# Patient Record
Sex: FEMALE | Race: OTHER
[De-identification: names, ages, dates, MRNs, and addresses within clinical notes are randomized per-mention and may not be internally consistent; named-entity substitution may affect disease eponyms.]

---

## 2018-05-20 ENCOUNTER — HOSPITAL ENCOUNTER (EMERGENCY)
Dept: HOSPITAL 62 - ER | Age: 33
Discharge: HOME | End: 2018-05-20
Payer: MEDICAID

## 2018-05-20 VITALS — DIASTOLIC BLOOD PRESSURE: 87 MMHG | SYSTOLIC BLOOD PRESSURE: 139 MMHG

## 2018-05-20 DIAGNOSIS — M79.605: ICD-10-CM

## 2018-05-20 DIAGNOSIS — M54.9: ICD-10-CM

## 2018-05-20 DIAGNOSIS — M79.604: ICD-10-CM

## 2018-05-20 DIAGNOSIS — M54.42: Primary | ICD-10-CM

## 2018-05-20 PROCEDURE — 96372 THER/PROPH/DIAG INJ SC/IM: CPT

## 2018-05-20 PROCEDURE — 72110 X-RAY EXAM L-2 SPINE 4/>VWS: CPT

## 2018-05-20 PROCEDURE — 99283 EMERGENCY DEPT VISIT LOW MDM: CPT

## 2018-05-20 NOTE — RADIOLOGY REPORT (SQ)
EXAM DESCRIPTION:  L SPINE WHOLE



COMPLETED DATE/TIME:  5/20/2018 8:24 pm



REASON FOR STUDY:  low back pain



COMPARISON:  10/16/2015



NUMBER OF VIEWS:  Five views including obliques.



TECHNIQUE:  AP, lateral, oblique, and sacral radiographic images acquired of the lumbar spine.



LIMITATIONS:  None.



FINDINGS:  MINERALIZATION: Normal.

SEGMENTATION: Normal.  No transitional anatomy.

ALIGNMENT: Normal.

VERTEBRAE: Maintained height.  No fracture or worrisome bone lesion.

DISCS: Preserved height.  No significant osteophytes or end plate irregularity.

POSTERIOR ELEMENTS: Pedicles and facets are intact.  No pars defect or posterior arch defects.

HARDWARE: None in the spine.

PARASPINAL SOFT TISSUES: Normal.

PELVIS: Intact as visualized. No fractures or worrisome bone lesions. SI joints intact.

OTHER: No other significant finding.



IMPRESSION:  No acute findings.



TECHNICAL DOCUMENTATION:  JOB ID:  [id number]

[id number]

 2011 [employer]- All Rights Reserved



Reading location - IP/workstation name: [email or web address]

## 2018-05-20 NOTE — ER DOCUMENT REPORT
ED Neck/Back Problem





- General


Chief Complaint: Back Pain


Stated Complaint: BACK PAIN


Time Seen by Provider: 05/20/18 19:34


Mode of Arrival: Ambulatory


Information source: Patient


Notes: 





33-year-old female presents to ED for bilateral low back pain radiates down 

both legs.  She states she has had low back pain since she was a teenager.  She 

states she has been seen for this about a year ago and given tramadol that was 

of no help.  She states she has never had an x-ray of her back even though she 

has been hurting since she was a teenager.


TRAVEL OUTSIDE OF THE [place] IN LAST 30 DAYS: No





- HPI


Patient complains to provider of: Lower back


Onset: Other - Patient states she has had the low back pain off and on since 

she was a teenager this time and started yesterday and is gotten progressively 

worse all day.


Onset: Chronic


Timing: Still present


Quality of pain: Achy, Sharp


Severity: Severe


Pain Level: 5


Recent injury: No


Associated symptoms: Like prior neck/back pain, Radiation to leg, Lower back 

pain.  denies: Incontinence, Motor loss, Numbness/tingling, Radiation to arm, 

Radiation to chest, Sensory loss, Sweaty, Unable to urinate, Upper back pain


Exacerbated by: Movement of trunk, Sitting position


Relieved by: Nothing


Similar symptoms previously: Yes


Recently seen / treated by doctor: No





- Related Data


Allergies/Adverse Reactions: 


 





No Known Allergies Allergy (Verified 05/20/18 18:37)


 











Past Medical History





- General


Information source: Patient





- Social History


Smoking Status: Never Smoker


Cigarette use (# per day): No


Smoking Education Provided: No


Frequency of alcohol use: None


Drug Abuse: None


Lives with: Family


Family History: Reviewed & Not Pertinent


Patient has suicidal ideation: No


Patient has homicidal ideation: No





- Past Medical History


Cardiac Medical History: Reports: None


Pulmonary Medical History: Reports: None


EENT Medical History: Reports: None


Neurological Medical History: Reports: None


Endocrine Medical History: Reports: None


Renal/ Medical History: Reports: None


Malignancy Medical History: Reports: None


GI Medical History: Reports: None


Musculoskeltal Medical History: Reports Other - Chronic back pain with sciatica


Skin Medical History: Reports None


Psychiatric Medical History: Reports: None


Traumatic Medical History: Reports: None


Infectious Medical History: Reports: None


Past Surgical History: Reports: Hx Appendectomy, Hx Tonsillectomy





- Immunizations


Hx Diphtheria, Pertussis, Tetanus Vaccination: No





Review of Systems





- Review of Systems


Constitutional: No symptoms reported


EENT: No symptoms reported


Cardiovascular: No symptoms reported


Respiratory: No symptoms reported


Gastrointestinal: No symptoms reported


Genitourinary: No symptoms reported


Female Genitourinary: No symptoms reported


Musculoskeletal: Back pain, Muscle pain, Muscle stiffness


Skin: No symptoms reported


Hematologic/Lymphatic: No symptoms reported


Neurological/Psychological: No symptoms reported





Physical Exam





- Vital signs


Vitals: 


 











Temp Pulse Resp BP Pulse Ox


 


 98.1 F   95   18   139/87 H  99 


 


 05/20/18 18:40  05/20/18 18:40  05/20/18 18:40  05/20/18 18:40  05/20/18 18:40











Interpretation: Normal





- General


General appearance: Appears well, Alert





- HEENT


Head: Normocephalic, Atraumatic


Eyes: Normal


Pupils: PERRL





- Respiratory


Respiratory status: No respiratory distress


Chest status: Nontender


Breath sounds: Normal


Chest palpation: Normal





- Cardiovascular


Rhythm: Regular


Heart sounds: Normal auscultation


Murmur: No





- Abdominal


Inspection: Normal


Distension: No distension


Bowel sounds: Normal


Tenderness: Nontender


Organomegaly: No organomegaly





- Back


Back: Normal, Tender, Vertebra tenderness, Other - Tenderness to the sacroiliac 

joint.  No: Deformity/step-off, CVA tenderness, Scars, Scoliosis, Wounds





- Extremities


General upper extremity: Normal inspection, Nontender, Normal color, Normal ROM

, Normal temperature


General lower extremity: Normal inspection, Nontender, Normal color, Normal ROM

, Normal temperature, Normal weight bearing.  No: Danielito's sign





- Neurological


Neuro grossly intact: Yes


Cognition: Normal


Orientation: AAOx4


Damion Coma Scale Eye Opening: Spontaneous


Damion Coma Scale Verbal: Oriented


Damion Coma Scale Motor: Obeys Commands


Damion Coma Scale Total: 15


Speech: Normal


Cranial nerves: Normal


Motor strength normal: LUE, RUE, LLE, RLE


Additional motor exam normals: Equal 


Babinski reflex: Normal (flexor plantar)


Sensory: Normal


Knee - Reflex grade: 2 = Normal


Ankle - Reflex grade: 2 = Normal





- Psychological


Associated symptoms: Normal affect, Normal mood





- Skin


Skin Temperature: Warm


Skin Moisture: Dry


Skin Color: Normal





Course





- Re-evaluation


Re-evalutation: 





05/20/18 20:37


After performing a Medical Screening Examination, I estimate there is LOW risk 

for EXPANDING OR RUPTURED ABDOMINAL AORTIC ANEURYSM, CAUDA EQUINA SYNDROME, 

EPIDURAL MASS LESION, or HERNIATED DISK CAUSING SEVERE SPINAL STENOSIS, thus I 

consider the discharge disposition reasonable.  I have reevaluated this patient 

multiple times and no significant life threatening changes are noted. The 

patient  and I have discussed the diagnosis and risks, and we agree with 

discharging home and close follow-up. We also discussed returning to the 

Emergency Department immediately if new or worsening symptoms occur with the 

understanding that symptoms and presentations can change. We have discussed the 

symptoms which are most concerning (e.g., saddle anesthesia, urinary or bowel 

incontinence or retention, changing or worsening pain) that necessitate 

immediate return.





- Vital Signs


Vital signs: 


 











Temp Pulse Resp BP Pulse Ox


 


 98.1 F   95   18   139/87 H  99 


 


 05/20/18 18:40  05/20/18 18:40  05/20/18 18:40  05/20/18 18:40  05/20/18 18:40














- Diagnostic Test


Radiology reviewed: Image reviewed, Reports reviewed





Discharge





- Discharge


Clinical Impression: 


Low back pain


Qualifiers:


 Chronicity: chronic Back pain laterality: bilateral Sciatica presence: with 

sciatica Sciatica laterality: bilateral sciatica Qualified Code(s): M54.42 - 

Lumbago with sciatica, left side





Condition: Stable


Disposition: HOME, SELF-CARE


Additional Instructions: 


LOW BACK PAIN:





     Three out of every four people will have an episode of disabling back pain 

during their lifetime.  Most commonly the pain is due to straining of the 

muscles and ligaments in the low back.


     Usual treatment includes: 


(1) Rest on a firm surface.  Avoid lying on your stomach.  


(2) Ice pack the painful area.  After a few days, gentle heat may be used 

intermittently to relax the area, or ice packs can be continued.  


(3) Medication may be needed -- muscle relaxers and antiinflammatory medicines 

are commonly used.  


(4) As the back improves, exercises are prescribed to strengthen the back and 

abdominal muscles.


     Your doctor will advise you on the proper care for your back at each stage 

in your recovery.  You may be better in a few days -- or healing may take 

several weeks.


     If new symptoms of a "herniated disc" (radiation of pain, numbness, or 

tingling down the back of the leg or weakness in the leg) occur, you should be 

re-examined.  Further testing may be necessary.





STEROID MEDICATION:








     You have been given a medicine of the cortisone/steroid class.  This 

medication is used to control inflammation or allergy.  It is usually only 

given for a short period of time, until the acute process subsides.


     There are usually no side effects from short-term use of cortisone-like 

medications.  Some persons feel an increased sense of well-being and are not 

sleepy at bedtime.  Long-term use of cortisone medications is best avoided, 

unless required for a severe condition.  If your condition does not remit, or 

relapses after the course of corticosteroid medication, you should consult your 

physician.





Stretching Exercises for the Back





     The physician has recommended that you begin stretching exercises for your 

back.  These are often used even while the back is painful. However, you should 

notify the physician if the activities seem to increase your pain.


     PELVIC TILT:  Lie flat on your back with knees bent.  Tighten your stomach 

and buttock muscles so it flattens your lower back against the floor.  Hold 10 

seconds.  Repeat 10 times, twice daily.


     KNEE RAISE:  Lying on the back with knees bent, raise one knee to your 

chest, then the other.  Hold both knees against the chest 10 seconds, then 

lower one knee at a time.  Repeat 10 times, twice daily.


     PARTIAL TRUNK RAISE:  Lie face down, arms at your sides.  Keeping your 

waist on the floor, use your arms raise your chest up.  Support yourself on 

your elbows for 30 seconds.  Repeat twice daily, increasing the time to two 

minutes as you recover.





MUSCLE RELAXERS: 


     Muscle relaxing medications are usually prescribed for acute muscle spasm 

or injury to the neck and back.  They are often combined with antiinflammatory 

pain medication for increased relief.


     You may stop the muscle relaxer when the pain and stiffness have improved.

  Start the medication again if spasms recur.  


     Muscle relaxers may cause drowsiness, especially with the first dose.  Do 

not operate machinery or drive while under the effects of the medication.  Most 

muscle relaxers last up to 24 hours.  Do not combine the medication with 

alcohol.








ICE PACKS:


     Apply ice packs frequently against the painful area.  Many different 

schedules are recommended, such as "20 minutes on, 20 minutes off" or "one hour 

ice, two hours rest."  If you need to work, you may need to go longer between 

ice treatments.  You should plan to have the area ice packed AT LEAST one 

fourth of the time.


     The ice should be applied over the wrap, tape, or splint, or over a layer 

of cloth -- not directly against the skin.  Some ice bags have a built-in cloth 

and can be put directly on the skin.





WARM PACKS:


     After approximately two days, apply gentle heat (such as a heating pad or 

hot water bottle) for about 20 to 30 minutes about every two hours -- at least 

four times daily.  Warmth and elevation will help you make a more rapid recovery

, and will ease the pain considerably.


     Do not use HOT heat, and never apply heat for longer than 30 minutes.  The 

continuous heat can invisibly damage skin and muscles -- even when no burn is 

seen on the surface.  Damaged muscles can make you MORE sore.








FOLLOW-UP CARE:


If you have been referred to a physician for follow-up care, call the physician

s office for an appointment as you were instructed or within the next two days.

  If you experience worsening or a significant change in your symptoms, notify 

the physician immediately or return to the Emergency Department at any time for 

re-evaluation.


Prescriptions: 


Cyclobenzaprine HCl [Flexeril 10 mg Tablet] 10 mg PO TIDP PRN #15 tab


 PRN Reason: 


Naproxen 500 mg PO BIDP PRN #14 tablet


 PRN Reason: 


Prednisone [Deltasone 20 mg Tablet] 3 tab PO DAILY 2 Days  tablet


Referrals: 


JESSE CHOWDHURY MD [Primary Care Provider] - Follow up as needed

## 2018-07-06 ENCOUNTER — HOSPITAL ENCOUNTER (EMERGENCY)
Dept: HOSPITAL 62 - ER | Age: 33
Discharge: HOME | End: 2018-07-06
Payer: OTHER GOVERNMENT

## 2018-07-06 VITALS — SYSTOLIC BLOOD PRESSURE: 151 MMHG | DIASTOLIC BLOOD PRESSURE: 89 MMHG

## 2018-07-06 DIAGNOSIS — K08.89: ICD-10-CM

## 2018-07-06 DIAGNOSIS — F17.200: ICD-10-CM

## 2018-07-06 DIAGNOSIS — K02.9: Primary | ICD-10-CM

## 2018-07-06 PROCEDURE — 99282 EMERGENCY DEPT VISIT SF MDM: CPT

## 2018-07-06 NOTE — ER DOCUMENT REPORT
HPI





- HPI


Patient complains to provider of: Dental pain


Onset: Yesterday


Onset/Duration: Persistent


Quality of pain: Achy, Sharp


Pain Level: 5


Context: 





Patient presents complaining of dental pain from a crown that fell out of her 

mouth.  Patient complains of severe pain.  Patient denies any fever.  No facial 

swelling.


Associated Symptoms: Other - Dental pain.  denies: Fever, Vomiting


Exacerbated by: Denies


Relieved by: Denies


Similar symptoms previously: Yes


Recently seen / treated by doctor: No





- ROS


ROS below otherwise negative: Yes


Systems Reviewed and Negative: Yes All other systems reviewed and negative





- CONSTITUTIONAL


Constitutional: DENIES: Fever, Chills





- EENT


EENT: REPORTS: Ear Pain


Notes: 





Dental pain





- GASTROINTESTINAL


Gastrointestinal: DENIES: Nausea, Patient vomiting





- REPRODUCTIVE


Reproductive: DENIES: Pregnant:





- MUSCULOSKELETAL


Musculoskeletal: DENIES: Back Pain, Neck Pain





- DERM


Skin Color: Normal


Skin Problems: None





Past Medical History





- General


Information source: Patient





- Social History


Smoking Status: Current Every Day Smoker


Smoking Education Provided: Yes


Drug Abuse: None


Occupation: None


Lives with: Family


Family History: Reviewed & Not Pertinent


Patient has suicidal ideation: No


Patient has homicidal ideation: No





- Medical History


Medical History: Negative


Renal/ Medical History: Denies: Hx Peritoneal Dialysis


Past Surgical History: Reports: Hx Appendectomy, Hx Tonsillectomy





- Immunizations


Hx Diphtheria, Pertussis, Tetanus Vaccination: No





Vertical Provider Document





- CONSTITUTIONAL


Agree With Documented VS: Yes


Exam Limitations: No Limitations


General Appearance: WD/WN, No Apparent Distress





- INFECTION CONTROL


TRAVEL OUTSIDE OF THE [place] IN LAST 30 DAYS: No





- HEENT


HEENT: Atraumatic, Normocephalic


Mouth Diagram: 


  __________________________














  __________________________





 1 - Dental decay, tenderness, no abscess, no trismus, no sublingual or 

submental swelling








- NECK


Neck: Normal Inspection, Supple.  negative: Lymphadenopathy-Left, 

Lymphadenopathy-Right





- RESPIRATORY


Respiratory: Breath Sounds Normal, No Respiratory Distress





- CARDIOVASCULAR


Cardiovascular: Regular Rate, Regular Rhythm





- BACK


Back: Normal Inspection





- MUSCULOSKELETAL/EXTREMETIES


Musculoskeletal/Extremeties: MAEW





- NEURO


Level of Consciousness: Awake, Alert, Appropriate


Motor/Sensory: No Motor Deficit





- DERM


Integumentary: Warm, Dry, No Rash





Course





- Vital Signs


Vital signs: 


 











Temp Pulse Resp BP Pulse Ox


 


 98.1 F   98   20   151/89 H  98 


 


 07/06/18 09:55  07/06/18 09:55  07/06/18 09:55  07/06/18 09:55  07/06/18 09:55














Discharge





- Discharge


Clinical Impression: 


 Toothache





Condition: Stable


Disposition: HOME, SELF-CARE


Instructions:  Oral Narcotic Medication ([facility]), Penicillin V K ([facility]), Toothache (

[facility])


Additional Instructions: 


Return immediately for any new or worsening symptoms





Followup with your primary care provider, call tomorrow to make a followup 

appointment





Follow up with a dental care provider


Prescriptions: 


Acetaminophen with Codeine [Acetaminophen-Cod #3 Tablet] 1 each PO Q6 PRN #15 

tablet


 PRN Reason: 


Naproxen [Naprosyn 250 Nmg Tablet] 1 tab PO BID #14 tablet


Penicillin V Potassium [Penicillin Vk 500 mg Tablet] 500 mg PO BID #20 tablet


Forms:  Smoking Cessation Education


Referrals: 


JESSE CHOWDHURY MD [Primary Care Provider] - Follow up as needed


[facility] [Provider Group] - Follow up as needed

## 2018-09-26 ENCOUNTER — HOSPITAL ENCOUNTER (EMERGENCY)
Dept: HOSPITAL 62 - ER | Age: 33
Discharge: HOME | End: 2018-09-26
Payer: OTHER GOVERNMENT

## 2018-09-26 VITALS — SYSTOLIC BLOOD PRESSURE: 138 MMHG | DIASTOLIC BLOOD PRESSURE: 84 MMHG

## 2018-09-26 DIAGNOSIS — R10.2: ICD-10-CM

## 2018-09-26 DIAGNOSIS — R10.9: Primary | ICD-10-CM

## 2018-09-26 DIAGNOSIS — R03.0: ICD-10-CM

## 2018-09-26 DIAGNOSIS — R11.0: ICD-10-CM

## 2018-09-26 LAB
ADD MANUAL DIFF: NO
ANION GAP SERPL CALC-SCNC: 11 MMOL/L (ref 5–19)
APPEARANCE UR: CLEAR
APTT PPP: YELLOW S
BASOPHILS # BLD AUTO: 0.1 10^3/UL (ref 0–0.2)
BASOPHILS NFR BLD AUTO: 0.5 % (ref 0–2)
BILIRUB UR QL STRIP: NEGATIVE
BUN SERPL-MCNC: 15 MG/DL (ref 7–20)
CALCIUM: 9.9 MG/DL (ref 8.4–10.2)
CHLORIDE SERPL-SCNC: 106 MMOL/L (ref 98–107)
CO2 SERPL-SCNC: 25 MMOL/L (ref 22–30)
EOSINOPHIL # BLD AUTO: 0.2 10^3/UL (ref 0–0.6)
EOSINOPHIL NFR BLD AUTO: 1.6 % (ref 0–6)
ERYTHROCYTE [DISTWIDTH] IN BLOOD BY AUTOMATED COUNT: 13.4 % (ref 11.5–14)
GLUCOSE SERPL-MCNC: 89 MG/DL (ref 75–110)
GLUCOSE UR STRIP-MCNC: NEGATIVE MG/DL
HCT VFR BLD CALC: 44.8 % (ref 36–47)
HGB BLD-MCNC: 14.9 G/DL (ref 12–15.5)
KETONES UR STRIP-MCNC: NEGATIVE MG/DL
LYMPHOCYTES # BLD AUTO: 2.9 10^3/UL (ref 0.5–4.7)
LYMPHOCYTES NFR BLD AUTO: 21.9 % (ref 13–45)
MCH RBC QN AUTO: 27.4 PG (ref 27–33.4)
MCHC RBC AUTO-ENTMCNC: 33.2 G/DL (ref 32–36)
MCV RBC AUTO: 83 FL (ref 80–97)
MONOCYTES # BLD AUTO: 1 10^3/UL (ref 0.1–1.4)
MONOCYTES NFR BLD AUTO: 7.6 % (ref 3–13)
NEUTROPHILS # BLD AUTO: 9.2 10^3/UL (ref 1.7–8.2)
NEUTS SEG NFR BLD AUTO: 68.4 % (ref 42–78)
NITRITE UR QL STRIP: NEGATIVE
PH UR STRIP: 5 [PH] (ref 5–9)
PLATELET # BLD: 226 10^3/UL (ref 150–450)
POTASSIUM SERPL-SCNC: 4.6 MMOL/L (ref 3.6–5)
PROT UR STRIP-MCNC: 30 MG/DL
RBC # BLD AUTO: 5.43 10^6/UL (ref 3.72–5.28)
SODIUM SERPL-SCNC: 141.8 MMOL/L (ref 137–145)
SP GR UR STRIP: 1.03
TOTAL CELLS COUNTED % (AUTO): 100 %
UROBILINOGEN UR-MCNC: 2 MG/DL (ref ?–2)
WBC # BLD AUTO: 13.4 10^3/UL (ref 4–10.5)

## 2018-09-26 PROCEDURE — 81001 URINALYSIS AUTO W/SCOPE: CPT

## 2018-09-26 PROCEDURE — 85025 COMPLETE CBC W/AUTO DIFF WBC: CPT

## 2018-09-26 PROCEDURE — 76830 TRANSVAGINAL US NON-OB: CPT

## 2018-09-26 PROCEDURE — 81025 URINE PREGNANCY TEST: CPT

## 2018-09-26 PROCEDURE — 36415 COLL VENOUS BLD VENIPUNCTURE: CPT

## 2018-09-26 PROCEDURE — 99284 EMERGENCY DEPT VISIT MOD MDM: CPT

## 2018-09-26 PROCEDURE — 93976 VASCULAR STUDY: CPT

## 2018-09-26 PROCEDURE — 80048 BASIC METABOLIC PNL TOTAL CA: CPT

## 2018-09-26 NOTE — ER DOCUMENT REPORT
ED General





- General


Chief Complaint: Abdominal Pain


Stated Complaint: STOMACH/LEG PAIN


Time Seen by Provider: 09/26/18 14:02


Mode of Arrival: Ambulatory


Information source: Patient


Notes: 





33-year-old female with history of ovarian cyst, uterine fibroids presents with 

complaint of left lower quadrant abdominal pain that started 2 days prior to 

arrival.  Patient describes the pain as stabbing, intermittent and worse with 

walking and sitting.  Patient has tried Naprosyn without relief.  She has had 

prior similar symptoms.  Patient has had associated nausea without vomiting.


TRAVEL OUTSIDE OF THE [place] IN LAST 30 DAYS: No





- HPI


Onset: Other


Onset/Duration: Gradual, Intermittent, Persistent


Quality of pain: Stabbing


Severity: Moderate


Associated symptoms: Nausea.  denies: Diarrhea, Vomiting


Exacerbated by: Sitting, Walking


Relieved by: Supine


Similar symptoms previously: Yes


Recently seen / treated by doctor: No





- Related Data


Allergies/Adverse Reactions: 


 





No Known Allergies Allergy (Verified 09/26/18 13:07)


 











Past Medical History





- General


Information source: Patient





- Social History


Smoking Status: Never Smoker


Frequency of alcohol use: None


Drug Abuse: None


Lives with: Spouse/Significant other


Family History: Reviewed & Not Pertinent


Patient has suicidal ideation: No


Patient has homicidal ideation: No


Renal/ Medical History: Reports: Hx Ovarian Cysts, Other - Fibroids.  Denies: 

Hx Peritoneal Dialysis


Past Surgical History: Reports: Hx Appendectomy, Hx Tonsillectomy





- Immunizations


Hx Diphtheria, Pertussis, Tetanus Vaccination: No





Review of Systems





- Review of Systems


Notes: 





REVIEW OF SYSTEMS:


CONSTITUTIONAL :  Denies fever,  chills, or sweats.  Denies recent illness. 

Denies weight loss, recent hospitalizations. 


EENT: Denies visual changes, eye pain.  Denies sore throat, oral lesions, 

difficulty swallowing.


CARDIOVASCULAR:  Denies chest pain.  Denies palpitations. Denies lower 

extremity edema.


RESPIRATORY:  Denies cough. Denies shortness of breath, wheezing.


GASTROINTESTINAL:  Denies abdominal  distention.  Denies  vomiting, or 

diarrhea.  Denies blood in vomitus, stools, or per rectum.  Denies black, tarry 

stools.  Denies constipation.  


GENITOURINARY:  Denies difficulty painful urination,  frequency, blood in urine

, or  vaginal discharge.


MUSCULOSKELETAL:  Denies back or neck pain or stiffness.  Denies joint pain or 

swelling.


SKIN:   Denies rash, lesions or sores.


HEMATOLOGIC :   Denies easy bruising or bleeding.


LYMPHATIC:  Denies swollen glands.


NEUROLOGICAL:  Denies confusion or altered mental status.  Denies loss of 

consciousness.  Denies dizziness or lightheadedness.  Denies headache.  Denies 

weakness or paralysis.  Denies problems difficulty with ambulation, slurred 

speech.  Denies sensory loss, numbness, or tingling.  Denies seizures.


PSYCHIATRIC:  Denies anxiety or stress.  Denies depression, suicidal ideation, 

or homicidal ideation.  Denies visual or auditory hallucinations.








Physical Exam





- Vital signs


Vitals: 


 











Temp Pulse Resp BP Pulse Ox


 


 98.2 F   96   20   143/97 H  99 


 


 09/26/18 13:10  09/26/18 13:10  09/26/18 13:10  09/26/18 13:10  09/26/18 13:10














Course





- Re-evaluation


Re-evalutation: 








Laboratory











  09/26/18 09/26/18 09/26/18





  14:26 14:26 15:50


 


WBC  13.4 H  


 


RBC  5.43 H  


 


Hgb  14.9  


 


Hct  44.8  


 


MCV  83  


 


MCH  27.4  


 


MCHC  33.2  


 


RDW  13.4  


 


Plt Count  226  


 


Seg Neutrophils %  68.4  


 


Lymphocytes %  21.9  


 


Monocytes %  7.6  


 


Eosinophils %  1.6  


 


Basophils %  0.5  


 


Absolute Neutrophils  9.2 H  


 


Absolute Lymphocytes  2.9  


 


Absolute Monocytes  1.0  


 


Absolute Eosinophils  0.2  


 


Absolute Basophils  0.1  


 


Sodium   141.8 


 


Potassium   4.6 


 


Chloride   106 


 


Carbon Dioxide   25 


 


Anion Gap   11 


 


BUN   15 


 


Creatinine   0.69 


 


Est GFR ( Amer)   > 60 


 


Est GFR (Non-Af Amer)   > 60 


 


Glucose   89 


 


Calcium   9.9 


 


Urine Color    YELLOW


 


Urine Appearance    CLEAR


 


Urine pH    5.0


 


Ur Specific Gravity    1.030


 


Urine Protein    30 H


 


Urine Glucose (UA)    NEGATIVE


 


Urine Ketones    NEGATIVE


 


Urine Blood    SMALL H


 


Urine Nitrite    NEGATIVE


 


Urine Bilirubin    NEGATIVE


 


Urine Urobilinogen    2.0 H


 


Ur Leukocyte Esterase    NEGATIVE


 


Urine WBC (Auto)    2


 


Urine RBC (Auto)    17


 


U Hyaline Cast (Auto)    2


 


Squamous Epi Cells Auto    5


 


Urine Mucus (Auto)    MANY


 


Urine Ascorbic Acid    NEGATIVE


 


Urine HCG, Qual    NEGATIVE














 





Transvaginal US  09/26/18 14:03


IMPRESSION:  No discrete uterine fibroids are identified.  Small amount of free 

fluid is identified in the right adnexal region.  Other findings as noted above


 








33-year-old female with history of ovarian cyst, uterine fibroids presents with 

complaint of left lower quadrant abdominal pain that started 2 days prior to 

arrival.  Patient describes the pain as stabbing, intermittent and worse with 

walking and sitting.  Patient has tried Naprosyn without relief.  She has had 

prior similar symptoms.  Patient has had associated nausea without vomiting.  

Vital signs stable upon arrival.  Patient appears uncomfortable but not toxic 

or dehydrated.  She is in mild distress. Patient reports improvement of pain 

after receiving morphine and Zofran.  Transvaginal ultrasound shows trace free 

fluid, no evidence of ovarian torsion, tubo-ovarian abscess, ovarian cyst.  CBC 

does show a mild leukocytosis without anemia.  CMP is without electrolyte 

abnormality.  HCG negative.  Urinalysis is not consistent with urinary tract 

infection. likely patient's symptoms secondary to ruptured ovarian cyst.  

Patient denies concern for sexually transmitted disease, vaginal discharge. 

Based on clinical history and examination I do not suspect an acute appendicitis

, tubo-ovarian abscess, pregnancy related pathology, ovarian torsion, 

pyelonephritis.  Patient provided the opportunity to ask questions, and express 

concerns.  Discharge instructions discussed. Patient is agreeable with 

discharge home.  Return indications explained and discussed with the patient 

who displays understanding.  Patient encouraged to return to the emergency 

department immediately with any concerns.





Results were discussed with the patient at this point, after careful 

consideration I feel that that patient can be discharged from the emergency 

department, the patient was educated treatments and reasons to return to the 

emergency department based on their presumed diagnosis as noted above, they 

were advised to followup with a primary care physician in 2-3 days. Patient was 

agreeable to plan of care.








Dictation on this chart was performed using voice recognition software and may 

result in unintended grammatical, spelling, syntax or errors.











09/26/18 17:34





09/26/18 17:35





09/26/18 17:35








- Vital Signs


Vital signs: 


 











Temp Pulse Resp BP Pulse Ox


 


 98.1 F   84   18   138/84 H  99 


 


 09/26/18 16:37  09/26/18 16:37  09/26/18 16:37  09/26/18 16:37  09/26/18 16:37














- Laboratory


Result Diagrams: 


 09/26/18 14:26





 09/26/18 14:26


Laboratory results interpreted by me: 


 











  09/26/18 09/26/18





  14:26 15:50


 


WBC  13.4 H 


 


RBC  5.43 H 


 


Absolute Neutrophils  9.2 H 


 


Urine Protein   30 H


 


Urine Blood   SMALL H


 


Urine Urobilinogen   2.0 H














- Diagnostic Test


Radiology reviewed: Image reviewed, Reports reviewed





Discharge





- Discharge


Clinical Impression: 


 Pelvic pain, History of ovarian cyst, Elevated blood pressure reading, Free 

fluid in pelvis





Condition: Good


Disposition: HOME, SELF-CARE


Instructions:  Abdominal Pain ([facility]), Ovarian Cyst ([facility]), Pelvic Pain ([facility])


Additional Instructions: 


Follow up with your physician tomorrow for further care or return to the ED 

IMMEDIATELY if symptoms worsen or new concerns occur. If you cannot afford to 

follow up with your primary care physician a list of low cost clinics have been 

provided at the end of your discharge papers as well.








Most prescribed medications have multiple side effects.  The safest thing to do 

is when filling your prescription please speak to your pharmacist regarding 

possible interactions with your normal home medications and over the counter 

medications such as Ibuprofen, Tylenol, Benadryl..  If you experience any 

symptoms that cause you discomfort or concern you should discontinue the 

medication immediately and return to the emergency room or call your primary 

care physician.


Prescriptions: 


Ibuprofen [Motrin 600 Mg Tablet] 600 mg PO TID #15 tablet


Ondansetron [Zofran Odt 4 mg Tablet] 1 tab PO Q6H PRN #10 tab.rapdis


 PRN Reason: For Nausea/Vomiting


Forms:  Elevated Blood Pressure


Referrals: 


JESSE CHOWDHURY MD [Primary Care Provider] - Follow up as needed

## 2018-09-26 NOTE — RADIOLOGY REPORT (SQ)
EXAM DESCRIPTION:  U/S NON OB PEL TV W/DOPPLER



COMPLETED DATE/TIME:  9/26/2018 3:16 pm



REASON FOR STUDY:  History of uterine fibroids, left lower quadrant p



COMPARISON:  None.



TECHNIQUE:  Dynamic and static grayscale images acquired of the pelvis via transvaginal approach and 
recorded on PACS. Additional selected color Doppler and spectral images recorded.



LIMITATIONS:  None.



FINDINGS:  UTERUS: Contour normal.  No mass.

ENDOMETRIAL STRIPE: No focal or generalized thickening. No masses.

CERVIX: No nabothian cysts.

RIGHT OVARY AND DOPPLER: Normal size. No worrisome masses. Normal arterial vascular flow without evid
ence for torsion.

LEFT OVARY AND DOPPLER: Normal size. No worrisome masses. Normal arterial vascular flow without evide
nce for torsion.

FREE FLUID: Small amount of free fluid is identified in the right adnexal region.

OTHER: No other significant finding.

MEASUREMENTS:

UTERUS: A 0.3 x 5.5 x 4.8 cm

ENDOMETRIAL STRIPE: 10 mm

RIGHT OVARY: 2.8 x 3.2 x 2.2 cm

LEFT OVARY: 2.6 x 2.1 x 2.3 cm



IMPRESSION:  No discrete uterine fibroids are identified.  Small amount of free fluid is identified i
n the right adnexal region.  Other findings as noted above



TECHNICAL DOCUMENTATION:  JOB ID:  [id number]

 [employer]- All Rights Reserved                          Rev-5/18



Reading location - IP/workstation name: [email or web address]

## 2019-06-29 NOTE — ER DOCUMENT REPORT
ED Medical Screen (RME)





- General


Chief Complaint: Flank Pain


Stated Complaint: ABDOMINAL PAIN


Time Seen by Provider: 19 10:30


Primary Care Provider: 


FLORIN SULLIVAN MD [Primary Care Provider] - Follow up as needed


Mode of Arrival: Ambulatory


Information source: Patient


Notes: 





Patient presents to the emergency department with complaints of lower back pain 

abdominal cramps for the last few days..  Is tender in the left lower quad.  

Denies flank pain.  Denies fever vomiting diarrhea.  Denies pain with void.  

Denies vaginal discharge and denies vaginal bleeding.  Patient is approximately 

13 weeks pregnant. . She reports she works as a  at the [employer] 12-hour shifts.  She is under the care of women's health care.  She did have

an ultrasound on .








I have greeted and performed a rapid initial assessment of this patient.  A 

comprehensive ED assessment and evaluation of the patient, analysis of test 

results and completion of the medical decision making process will be conducted 

by additional ED providers.  Dictation of this chart was performed using voice 

recognition software; therefore, there may be some unintended grammatical 

errors.


TRAVEL OUTSIDE OF THE [place] IN LAST 30 DAYS: No





- Related Data


Allergies/Adverse Reactions: 


                                        





No Known Allergies Allergy (Verified 19 09:55)


   











Past Medical History


Renal/ Medical History: Reports: Hx Ovarian Cysts.  Denies: Hx Peritoneal 

Dialysis


Past Surgical History: Reports: Hx Appendectomy, Hx Tonsillectomy





- Immunizations


Hx Diphtheria, Pertussis, Tetanus Vaccination: No





Physical Exam





- Vital signs


Vitals: 





                                        











Temp Pulse Resp BP Pulse Ox


 


 98.1 F   86   16   128/82 H  99 


 


 19 10:05  19 10:05  19 10:05  19 10:05  19 10:05














Course





- Vital Signs


Vital signs: 





                                        











Temp Pulse Resp BP Pulse Ox


 


 98.1 F   86   16   128/82 H  99 


 


 19 10:05  19 10:05  19 10:05  19 10:05  19 10:05














Doctor's Discharge





- Discharge


Referrals: 


FLORIN SULLIVAN MD [Primary Care Provider] - Follow up as needed

## 2019-06-29 NOTE — ER DOCUMENT REPORT
Addendum entered and electronically signed by BERNARDA MINOR PA-C  06/29/19

13:26: 








Discharge





- Discharge


Clinical Impression: 


 Pelvic pain, Fatigue, Carpal tunnel syndrome





Condition: Good


Disposition: HOME, SELF-CARE


Instructions:  Carpal Tunnel Syndrome ([facility]), Pelvic Pain in Pregnancy ([facility])


Additional Instructions: 


REST.   YOU MAY TAKE ONLY TYLENOL FOR YOUR PAIN.   WEAR YOUR SPLINT FOR YOUR 

WRIST AT NIGHT.   RETURN IF WORSENING SYMPTOMS SUCH AS FEVER, CHILLS, WORSENING 

PAIN, INTRACTABLE VOMITING.  PUSH FLUIDS. FOLLOW UP WITH YOUR OB ON MONDAY 

WITHOUT FAIL. 


Forms:  Return to Work


Referrals: 


FLORIN SULLIVAN MD [Primary Care Provider] - Follow up in 3-5 days





Original Note:








ED General





- General


Chief Complaint: Flank Pain


Stated Complaint: ABDOMINAL PAIN


Time Seen by Provider: 06/29/19 10:30


Primary Care Provider: 


FLORIN SULLIVAN MD [Primary Care Provider] - Follow up as needed


Mode of Arrival: Ambulatory


TRAVEL OUTSIDE OF THE [place] IN LAST 30 DAYS: No





- HPI


Notes: 





34-year-old female G5, P4 to the emergency department with complaints of left 

lower pelvic cramping, urinary frequency, shortness of breath, and generalized 

fatigue.  She states that this started about 1 week ago.  She denies any vaginal

bleeding, vaginal discharge.  Notes that she has a history of ovarian cysts and 

also states that she has had more urinary frequency than she would expect being 

pregnant.  She denies dysuria.  Is that she is followed at women's health and 

she had a confirmatory IUP on ultrasound in June 19.  Approximately 13 weeks 

pregnant.  She states that she works as a  and typically works 12 to 

14 hours a day 7 days a week.  She has been recently struggling with carpal 

tunnel in her right arm and it will keep her up at night with pain, numbness and

tingling.  States that she notices the shortness of breath mainly when she is 

exerting herself and going upstairs.  She denies any cough, fever, leg swelling,

recent travel, history of DVT.  Does not use cocaine.  In regards to her 

fatigue, the patient states that she just feels extremely exhausted every single

day and that she has no energy.





- Related Data


Allergies/Adverse Reactions: 


                                        





No Known Allergies Allergy (Verified 06/29/19 09:55)


   











Past Medical History





- General


Information source: Patient





- Social History


Smoking Status: Never Smoker


Frequency of alcohol use: None


Drug Abuse: None.  denies: Cocaine


Family History: Reviewed & Not Pertinent


Patient has suicidal ideation: No


Patient has homicidal ideation: No


Renal/ Medical History: Reports: Hx Ovarian Cysts.  Denies: Hx Peritoneal 

Dialysis


Past Surgical History: Reports: Hx Appendectomy, Hx Tonsillectomy





- Immunizations


Hx Diphtheria, Pertussis, Tetanus Vaccination: No





Review of Systems





- Review of Systems


Constitutional: Weakness, Other - +fatigue.  denies: Chills, Fever


EENT: No symptoms reported


Cardiovascular: denies: Chest pain, Palpitations, Orthopnea, Dyspnea, Syncope, 

Dizziness, Edema


Respiratory: Short of breath - exertional shortness of breath.  denies: Cough, 

Wheezing


Gastrointestinal: Abdominal pain, Diarrhea, Nausea, Vomiting


Genitourinary: No symptoms reported


Skin: No symptoms reported


Neurological/Psychological: No symptoms reported


-: Yes All other systems reviewed and negative





Physical Exam





- Vital signs


Vitals: 


                                        











Temp Pulse Resp BP Pulse Ox


 


 98.1 F   86   16   128/82 H  99 


 


 06/29/19 10:05  06/29/19 10:05  06/29/19 10:05  06/29/19 10:05  06/29/19 10:05











Interpretation: Normal





- General


General appearance: Appears well, Alert





- HEENT


Head: Normocephalic, Atraumatic


Eyes: Normal


Pupils: PERRL





- Respiratory


Respiratory status: No respiratory distress


Chest status: Nontender


Breath sounds: Normal


Chest palpation: Normal





- Cardiovascular


Rhythm: Regular - No leg edema


Heart sounds: Normal auscultation


Murmur: No


Notes: 





no Pitting edema





- Abdominal


Inspection: Normal


Distension: No distension


Bowel sounds: Normal


Tenderness: Tender - Positive tenderness to palpation over the left lower 

quadrant/left pelvis.  No CVA tenderness.  No rebound, no guarding.  Negative 

McBurney's point.  No: McBurney's point - ., Saunders's sign, Guarding, Rebound


Organomegaly: No organomegaly





- Back


Back: Nontender.  No: CVA tenderness





- Extremities


General upper extremity: Normal inspection - Bilateral lower legs and calves 

with no tenderness to palpation.  No palpable cords.  Negative Homans.  No 

warmth, no erythema, no skin changes., Nontender, Normal color, Normal ROM, 

Normal temperature


General lower extremity: Normal inspection, Nontender, Normal color, Normal ROM,

Normal temperature, Normal weight bearing.  No: Danielito's sign





- Neurological


Neuro grossly intact: Yes


Cognition: Normal


Orientation: AAOx4


Nazareth Coma Scale Eye Opening: Spontaneous


Nazareth Coma Scale Verbal: Oriented


Nazareth Coma Scale Motor: Obeys Commands


Damion Coma Scale Total: 15


Speech: Normal


Motor strength normal: LUE, RUE, LLE, RLE


Sensory: Normal





- Psychological


Associated symptoms: Normal affect, Normal mood





- Skin


Skin Temperature: Warm


Skin Moisture: Dry


Skin Color: Normal





Course





- Vital Signs


Vital signs: 


                                        











Temp Pulse Resp BP Pulse Ox


 


 98.1 F   86   16   128/82 H  99 


 


 06/29/19 10:05  06/29/19 10:05  06/29/19 10:05  06/29/19 10:05  06/29/19 10:05














- Laboratory


Result Diagrams: 


                                 06/29/19 11:02





                                 06/29/19 11:02


Laboratory results interpreted by me: 


                                        











  06/29/19 06/29/19 06/29/19





  10:45 11:02 11:02


 


RDW   15.1 H 


 


Sodium    136.5 L


 


Creatinine    0.44 L


 


Urine Blood  SMALL H  


 


Ur Leukocyte Esterase  SMALL H  














- Transfer of Care


Notes: 





06/29/19 this patient with Dr. Huber.  She agrees with the plan for labs, EKG, 

ultrasound.  Patient is not hypoxic, tachycardic, tachypneic, has no lower leg 

edema, pain, skin changes.  Will monitor closely.





Noted ultrasound results as well as lab work.  Lab work very reassuring.  

Patient declined a chest x-ray today and she also declined a pelvic exam.  She 

just recently had STI screening with her OB last week and does not have any co

ncerns.  Noted urinalysis with no acute infection.  Ultrasound does not 

visualize left ovary well.  Discussed this with Dr. Huber.  She does not have an

extraordinary amount of pain and is resting quietly in the emergency department 

and is not requiring pain control.  Low suspicion for TOA or ovarian torsion 

given her presentation.  Likely her pelvic pain is a result of either round 

ligament stretching and pregnancy or a small ovarian cyst.  Do not think further

imaging is necessary at this point; Dr. Huber agrees.  Discussed this with 

patient and she also agrees with the plan.  Have urged her to follow-up with 

OB/GYN on Monday without fail.  I have encouraged her to rest and to push 

fluids.  Encouraged her to return if her symptoms worsen such as worsening 

abdominal pain, vaginal bleeding, fever, chest pain, worsening shortness of 

breath.  She agrees with the plan.





Discharge





- Discharge


Clinical Impression: 


 Pelvic pain, Fatigue, Carpal tunnel syndrome





Condition: Good


Disposition: HOME, SELF-CARE


Instructions:  Pelvic Pain in Pregnancy ([facility]), Carpal Tunnel Syndrome ([facility])


Additional Instructions: 


REST.   YOU MAY TAKE ONLY TYLENOL FOR YOUR PAIN.   WEAR YOUR SPLINT FOR YOUR 

WRIST AT NIGHT.   RETURN IF WORSENING SYMPTOMS SUCH AS FEVER, CHILLS, WORSENING 

PAIN, INTRACTABLE VOMITING.  PUSH FLUIDS. FOLLOW UP WITH YOUR OB ON MONDAY 

WITHOUT FAIL. 


Referrals: 


FLORIN SULLIVAN MD [Primary Care Provider] - Follow up in 3-5 days

## 2019-06-29 NOTE — RADIOLOGY REPORT (SQ)
EXAM DESCRIPTION:  U/S OB TRANSVAG W/DOPPLER



COMPLETED DATE/TIME:  6/29/2019 12:28 pm



REASON FOR STUDY:  LLQ abd pelvic pain, pregnant, hx of ovarian cysts



COMPARISON:  None.



TECHNIQUE:  Transvaginal static and realtime grayscale images acquired of the pelvis. Additional anni
cted spectral and color Doppler images recorded. All images stored on PACs.

bHCG: Not available.

CLINICAL DATES:  12 week 6 day.



LIMITATIONS:  None.



FINDINGS:  FETUS:  Single Living intrauterine pregnancy.

ULTRASOUND EGA: 12 week 4 day.

ULTRASOUND ABEL: 1/7/2020.

EFW: Not applicable less than 20 weeks.

CRL:  6.1 cm.

FHR: 143  beats per minute.

FETAL SURVEY: No visualized anomalies.

AMNIOTIC FLUID: Adequate amount.

PLACENTA: Not yet developed due to early gestation.

SUBCHORIONIC BLEED: No.

SIZE OF BLEED: Not applicable.

UTERUS: No masses. No anomalies.

CERVICAL LENGTH: 3.7 cm.   Closed.

RIGHT ADNEXA: Normal ovary with normal vascular flow.

No adnexal free fluid.

No adnexal masses.

LEFT ADNEXA: Ovary not identified due to poor acoustical window.

No adnexal free fluid.

No adnexal masses.

FREE FLUID: None.

OTHER: No other significant finding.



IMPRESSION:  LIVING INTRAUTERINE PREGNANCY.

EGA 12 WEEK 4 DAY.

Trimester of pregnancy:  First - 0 to 13 weeks.



TECHNICAL DOCUMENTATION:  JOB ID:  [id number]

 2011 [employer]- All Rights Reserved                            rev-5/18



Reading location - IP/workstation name: [email or web address]

## 2019-11-29 NOTE — NON STRESS TEST REPORT
=================================================================

Non Stress Test

=================================================================

Datetime Report Generated by CPN: 11/29/2019 20:34

   

   

=================================================================

DEMOGRAPHIC

=================================================================

   

EGA NST:  34.5

   

=================================================================

INDICATION

=================================================================

   

Indication for Study (NST) Other:  AMA

   

=================================================================

VITAL SIGNS

=================================================================

   

Temperature - NST:  98.4

   

=================================================================

MONITORING

=================================================================

   

Monitor Explained:  Monitor Explained; Test Explained; Patient

   Verbalized Understanding

Time on Monitor:  11/29/2019 18:19

Time off Monitor:  11/29/2019 19:09

NST Duration:  50

   

=================================================================

NST INTERVENTIONS

=================================================================

   

NST Interventions:  PO Hydration; Reposition Patient

Physician Notified NST:  Dr. Younger

BABY A:  [id number]

   

=================================================================

BABY A

=================================================================

   

Fetal Movement :  Present

Contraction Frequency :  none

FHR Baseline :  125

Accelerations :  15X15

Decelerations :  Variable

Variability :  Moderate 6-25bpm

NST Review:  Meets Criteria for Reactive NST

NST Review and Verified By :  HOA Bello RN

NST Results:  Reactive

   

=================================================================

NST REPORT

=================================================================

   

Report Trigger:  Send Report

## 2019-12-20 NOTE — NON STRESS TEST REPORT
=================================================================

Non Stress Test

=================================================================

Datetime Report Generated by CPN: 12/20/2019 11:07

   

   

=================================================================

DEMOGRAPHIC

=================================================================

   

EGA NST:  37.5

   

=================================================================

INDICATION

=================================================================

   

Indication for Study (NST) Other:  iup AMA repeat NST

   

=================================================================

VITAL SIGNS

=================================================================

   

Temperature - NST:  97.7

Pulse - NST:  80

RESP - NST:  14

NBPSYS NST:  107

NBPDIA NST:  55

   

=================================================================

MONITORING

=================================================================

   

Monitor Explained:  Monitor Explained; Test Explained; Patient

   Verbalized Understanding

Time on Monitor:  12/20/2019 10:31

Time off Monitor:  12/20/2019 10:58

NST Duration:  27

   

=================================================================

NST INTERVENTIONS

=================================================================

   

NST Interventions:  PO Hydration

Physician Notified NST:  Dr Sterling

BABY A:  [id number]

   

=================================================================

BABY A

=================================================================

   

Fetal Movement :  Present

Contraction Frequency :  0

FHR Baseline :  120

Accelerations :  15X15

Decelerations :  None

Variability :  Moderate 6-25bpm

NST Review:  Meets Criteria for Reactive NST

NST Review and Verified By :  Rosibel Camp RNC

   

=================================================================

NST REPORT

=================================================================

   

Report Trigger:  Send Report

## 2020-01-07 NOTE — NON STRESS TEST REPORT
=================================================================

Non Stress Test

=================================================================

Datetime Report Generated by CPN: 01/07/2020 12:48

   

   

=================================================================

DEMOGRAPHIC

=================================================================

   

Test Number:  3

EGA NST:  40.2

   

=================================================================

INDICATION

=================================================================

   

Indication for Study (NST) Other:  Nonreactive NST at [facility]

   

=================================================================

MONITORING

=================================================================

   

Monitor Explained:  Monitor Explained; Test Explained; Patient

   Verbalized Understanding

Time on Monitor:  01/07/2020 09:39

Time off Monitor:  01/07/2020 11:19

NST Duration:  100

   

=================================================================

NST INTERVENTIONS

=================================================================

   

NST Interventions:  PO Hydration

Physician Notified NST:  DR YIN REVIEWED STRIP

BABY A:  [id number]

   

=================================================================

BABY A

=================================================================

   

Fetal Movement :  Present

Contraction Frequency :  OCC

FHR Baseline :  120

Accelerations :  15X15

Decelerations :  None

Variability :  Moderate 6-25bpm

NST Review:  Meets Criteria for Reactive NST

NST Review and Verified By :  WILLIAM Stewart RN

NST Results:  Reactive

   

=================================================================

NST REPORT

=================================================================

   

Report Trigger:  Send Report

## 2020-01-11 NOTE — ADMISSION PHYSICAL
=================================================================



=================================================================

Datetime Report Generated by CPN: 2020 12:09

   

   

=================================================================

CURRENT ADMISSION

=================================================================

   

Chief Complaint:  Scheduled Induction of Labor

Indication for Induction:  Post Dates; Oligohydramnios

Indication for Induction- Other:  35 yo  at 40.6 wks EGA for 

   IOL d/t postdates pregancy, AMA and oligohydraminos. Good fetal

   movement. Cervix 3/50/-2, intact membranes

Admit Impression :  Postterm, Intrauterine Pregnancy; Induction of Labor

Admit Plan:  Admit to Unit; Initiate Labor Induction Protocol

   

=================================================================

ALLERGIES

=================================================================

   

Medication Allergies:  No

Medication Allergies:  No Known Allergies (2020)

Latex:  No Latex Allergies

Food Allergies:  n/a

Environmental Allergies:  n/a

   

=================================================================

OBSTETRICAL HISTORY

=================================================================

   

EDC:  2020 00:00

:  6

Para:  5

Term:  5

:  0

SAB:  0

IAB:  0

Ectopic:  0

Livin

Cesareans:  0

VBACs:  0

Multiple Births:  0

Gestational Diabetes:  No

Rh Sensitization:  No

Incompetent Cervix:  No

RAFFI:  No

Infertility:  No

ART Treatment:  No

Uterine Anomaly:  No

IUGR:  No

Hx Previous C/S:  No

Macrosomia:  No

Hx Loss/Stillborn:  No

PIH:  No

Hx  Death:  No

Placenta Previa/Abruption:  No

Depression/PP Depression:  No

PTL/PROM:  No

Post Partum Hemorrhage:  No

Current Pregnancy Procedures:  Ultrasound

Obstetrical History Comments:  G1: 2002: 40 wks; 7lbs male, ; baby

    of SIDS

   G2: ; 40 wks, 8lbs  male, 

   G3: , 40 wks, 7lbs female; 

   G4: , 40.6 wks, 7lb female, vaginal

   G5: , 39.3 wks, 7bs 13 oz male, vaginal

      

   

=================================================================

***SEE PRENATAL RECORDS***

=================================================================

   

Alcohol:  No

Marijuana :  No

Cocaine:  No

Other Illicit Drugs:  No

Cigarettes:  Never Smoker. [id number]

   

=================================================================

MEDICAL HISTORY

=================================================================

   

Diabetes:  No

Blood Transfusion:  No

Pulmonary Disease (Asthma, TB):  No

Breast Disease:  No

Hypertension:  No

Gyn Surgery:  No

Heart Disease:  No

Hosp/Surgery:  Yes

Autoimmune Disorder:  No

Anesthetic Complications:  No

Kidney Disease:  No

Abnormal Pap Smear:  No

Neuro/Epilepsy:  No

Psychiatric Disorders:  No

Other Medical Diseases:  No

Hepatitis/Liver Disease:  No

Significant Family History:  No

Varicosities/Phlebitis:  No

Trauma/Violence :  No

Thyroid Dysfunction:  No

Medical History Comments:  appendectomy, tonsilectomy, childbirth

   

=================================================================

INFECTIOUS HISTORY

=================================================================

   

Gonorrhea:  No

Genital Herpes:  No

Chlamydia:  No

Tuberculosis:  No

Syphilis:  No

Hepatitis:  No

HIV/AIDS Exposure:  No

Rash or Viral Illness:  No

HPV:  No

   

=================================================================

PHYSICAL EXAM

=================================================================

   

General:  Normal

HEENT:  Normal

Neurologic:  Normal

Thyroid:  Normal

Heart:  Normal

Lungs:  Normal

Breast:  Normal

Back:  Normal

Abdomen:  Normal

Genitourinary Exam:  Normal

Extremities:  Normal

DTRs:  Normal

Pelvic Type:  Adequate

Vital Signs:  Reviewed

   

=================================================================

VAGINAL EXAM

=================================================================

   

Dilatation:  3

Effacement:  50

Station:  -2

Contraction Comments:  Regular contractions but mildly painful

   

=================================================================

MEMBRANES

=================================================================

   

Membranes:  Intact

Amniotic Fluid Color:  Clear

   

=================================================================

FETUS A

=================================================================

   

EGA:  40.6

Monitoring:  External US

FHR- Baseline:  130

Variability:  Moderate 6-25bpm

Accelerations:  Prolonged

Decelerations:  None

FHR Category:  Category I

Fetal Presentation:  Vertex

Admit Comment:  35 yo  at 40.6 wksEGA for IOL d/t pregnancy

   complicated by AMA, Post dates and oligohydraminos with KALE of 5 cm

   yesterday by US

   -Admit to LDR

   -NPO and IVFs

   -CEFM and toco

   -Will paln to AROM  and then Pitocin as needed

   -GBS negative

   -Hx of 5 SVDs, uncomplicated

   -Anticipate 

   -Does not want epidural

      

   

=================================================================

PLANS FOR LABOR AND DELIVERY

=================================================================

   

Labor and Delivery:  None

Pain Management:  Natural

Feeding Preference:  Breast

Benefit of Breast Feed Discussed:  Yes

Circumcision:  No

   

=================================================================

INFORMED CONSENT

=================================================================

   

Informed Consent Obtained:  Vaginal Delivery; Induction of Labor;

   Risks, Benefits and Alternatives Discussed

Signature:  Electronically signed by Lise Blanco MD on 2020 at

   12:08  with User ID: [email or web address]

:  Electronically signed by Lise Blanco MD on 2020 at 12:08 

   with User ID: [email or web address]

## 2020-01-11 NOTE — DELIVERY SUMMARY
=================================================================

Del Sum A-C

=================================================================

Datetime Report Generated by CPN: 2020 17:01

   

   

=================================================================

DELIVERY PERSONNEL

=================================================================

   

DELIVERY PERSONNEL:  [id number]

Delivery Doctor::  Lise Blanco MD

Labor and Delivery Nurse::  Dori Dawson RN

Labor and Delivery Nurse::  Deysi Bhardwaj RN

Scrub Tech/CNA:  Maria G Delacruz, ST

Scrub Tech/CNA:  Diandra Saunders, CST

   

=================================================================

MATERNAL INFORMATION

=================================================================

   

Delivery Anesthesia:  None

Medications After Delivery:  Pitocin Bolus-Please Comment

Meds After Delivery Comment:  pitocin 20 units in 1000mL nss

Estimated  Blood Loss (ml):  100

Delivery QBL:  100

Maternal Complications:  None (Annotations: Data stored by CPN on

   behalf of user)

Provider Comments:  Called to patients room Complete and +3 with urge

   to push. Pushed throough two contractions and delivered over intact

   perineum. MIGUEL ANGEL at delivery. No Nuchal. Cord clamping delayed for 30

   seconds as infant was vigorous. Skin to skin with mother. BOth

   mother and infant stable 

   

=================================================================

LABOR SUMMARY

=================================================================

   

EDC:  2020 00:00

No. Babies in Womb:  1

 Attempted:  No

Labor Anesthesia:  None

   

=================================================================

LABOR INFORMATION

=================================================================

   

Reason for Induction:  Other

Reason for Induction- Other:  AMA, borderline oligo

Onset of Labor:  2020 11:28

Complete Dilatation:  2020 12:36

Oxytocin:  Induction

Group B Beta Strep:  negative (Annotations: Data stored by CPN on

   behalf of user)

Steroids Given:  None

Reason Steroids Not Administered:  Not Applicable

   

=================================================================

MEMBRANES

=================================================================

   

Membranes Rupture Method:  Artificial

Rupture of Membranes:  2020 08:29

Length of Rupture (hr):  4.28

Amniotic Fluid Color:  Clear

Amniotic Fluid Amount:  Scant

Amniotic Fluid Odor:  Normal

   

=================================================================

STAGES OF LABOR

=================================================================

   

Stage 1 hr:  1

Stage 1 min:  8

Stage 2 hr:  0

Stage 2 min:  10

Stage 3 hr:  0

Stage 3 min:  4

Total Time in Labor hr:  1

Total Time in Labor min:  22

   

=================================================================

VAGINAL DELIVERY

=================================================================

   

Episiotomy:  None

Laceration #1:  None

Laceration Extension #1:  N/A

Laceration Repair:  Not Applicable

Sponge Count Correct:  N/A

Sharps Count Correct:  N/A

   

=================================================================

CSECTION DELIVERY

=================================================================

   

Primary Indication:  N/A

Secondary Indication:  N/A

CSection Incidence:  N/A

Labor:  N/A

Elective:  N/A

CSection Incision:  N/A

   

=================================================================

BABY A INFORMATION

=================================================================

   

Infant Delivery Date/Time:  2020 12:46

Method of Delivery:  Vaginal

Born in Route :  No

:  N/A

Forceps:  N/A

Vacuum Extraction:  N/A

Shoulder Dystocia :  No

   

=================================================================

PRESENTATION/POSITION BABY A

=================================================================

   

Presentation:  Cephalic

Cephalic Presentation:  Vertex

Vertex Position:  Left Occipital Anterior

Breech Presentation:  N/A

   

=================================================================

PLACENTA INFORMATION BABY A

=================================================================

   

Placenta Delivery Time :  2020 12:50

Placenta Method of Delivery:  Spontaneous

Placenta Status:  Delivered

   

=================================================================

APGAR SCORES BABY A

=================================================================

   

Heart Rate 1 min:  >100 bpm

Resp Effort 1 min:  Good Cry

Reflex Irritability 1 min:  Cough or Sneeze or Pulls Away

Muscle Tone 1 min:  Active Motion

Color 1 min:  Blue/Pale

Resuscitation Effort 1 min:  Tactile Stimulation

APGAR SCORE 1 MIN:  8

Heart Rate 5 min:  >100 bpm

Resp Effort 5 min:  Good Cry

Reflex Irritability 5 min:  Cough or Sneeze or Pulls Away

Muscle Tone 5 min:  Active Motion

Color 5 min:  Body Pink, Extremities Blue

Resuscitation Effort 5 min:  Tactile Stimulation

APGAR SCORE 5 MIN:  9

   

=================================================================

INFANT INFORMATION BABY A

=================================================================

   

Gestational Age at Delivery:  40.6

Gestational Status:  Full Term- 39- 40.6 Weeks

Infant Outcome :  Liveborn

Infant Condition :  Stable

Infant Sex:  Male

   

=================================================================

IDENTIFICATION BABY A

=================================================================

   

Infant Verification Date/Time:  2020 12:57

ID Band Number:  [id number]

Mother's Name Verified:  Yes

Infant Medical Record Number:  [id number]

RN Verifying Infant:  B Baidy RN and H. Lashae, RN

   

=================================================================

WEIGHT/LENGTH BABY A

=================================================================

   

Infant Birthweight (gm):  3811

Infant Weight (lb):  8

Infant Weight (oz):  6

Infant Length (in):  19.50

Infant Length (cm):  49.53

   

=================================================================

CORD INFORMATION BABY A

=================================================================

   

No. Cord Vessels:  3

Nuchal Cord :  N/A

Cord Blood Taken:  Yes-For Eval (Mom's Blood Type - or O+)

Infant Suction:  Mouth

   

=================================================================

ASSESSMENT BABY A

=================================================================

   

Infant Complications:  None

Physical Findings at Delivery:  Within Normal Limits

Infant Respirations:  Appears Normal

Skin to Skin:  Yes

Skin to Skin Time (min):  90

Neonatologist/ALS Called :  No

Infant Care By:  H Lashae RN

Transferred To:  Remains with Mother

   

=================================================================

BABY B INFORMATION

=================================================================

   

 :  N/A

   

=================================================================

SIGNATURES

=================================================================

   

Signature:  Electronically signed by Lise Blanco MD on 2020 at

   13:48  with User ID: [email or web address]

:  Electronically signed by Lise Blanco MD on 2020 at 13:48 

   with User ID: [email or web address]

## 2020-01-12 NOTE — PDOC DISCHARGE SUMMARY
Impression





- Admit/DC Date/PCP


Admission Date/Primary Care Provider: 


  01/11/20 06:31





  FLORIN SULLIVAN MD





Discharge Date: 01/12/20





- Discharge Diagnosis


(1) Oligohydramnios


Is this a current diagnosis for this admission?: Yes   





(2) Encounter for induction of labor


Is this a current diagnosis for this admission?: Yes   





- Additional Information


Discharge Diet: Regular


Discharge Activity: Balance Activity w/Rest, Pelvic Rest


Referrals: 


FLORIN SULLIVAN MD [Primary Care Provider] - 


Prescriptions: 


Ibuprofen [Motrin 800 mg Tablet] 800 mg PO Q8HP PRN #60 tablet


 PRN Reason: 


Home Medications: 








Pnv No.95/Ferrous Fum/Folic AC [Prenatal Caplet] 1 tab PO DAILY 11/29/19 


Ibuprofen [Motrin 800 mg Tablet] 800 mg PO Q8HP PRN #60 tablet 01/12/20 











HPI


Gestational Age: 40+6


Reason(s) for Admission: Induction of Labor


Prenatal Procedures: NST


Intrapartum Procedure(s): Spontaneous Vaginal Delivery





Results


Laboratory Results: 


                                        











WBC  17.0 10^3/uL (4.0-10.5)  H  01/12/20  06:47    


 


RBC  4.15 10^6/uL (3.72-5.28)   01/12/20  06:47    


 


Hgb  11.4 g/dL (12.0-15.5)  L  01/12/20  06:47    


 


Hct  34.4 % (36.0-47.0)  L  01/12/20  06:47    


 


MCV  83 fl (80-97)   01/12/20  06:47    


 


MCH  27.5 pg (27.0-33.4)   01/12/20  06:47    


 


MCHC  33.2 g/dL (32.0-36.0)   01/12/20  06:47    


 


RDW  13.7 % (11.5-14.0)   01/12/20  06:47    


 


Plt Count  242 10^3/uL (150-450)   01/12/20  06:47    


 


Lymph % (Auto)  23.0 % (13-45)   01/11/20  06:52    


 


Mono % (Auto)  9.8 % (3-13)   01/11/20  06:52    


 


Eos % (Auto)  1.8 % (0-6)   01/11/20  06:52    


 


Baso % (Auto)  0.5 % (0-2)   01/11/20  06:52    


 


Absolute Neuts (auto)  9.6 10^3/uL (1.7-8.2)  H  01/11/20  06:52    


 


Absolute Lymphs (auto)  3.4 10^3/uL (0.5-4.7)   01/11/20  06:52    


 


Absolute Monos (auto)  1.5 10^3/uL (0.1-1.4)  H  01/11/20  06:52    


 


Absolute Eos (auto)  0.3 10^3/uL (0.0-0.6)   01/11/20  06:52    


 


Absolute Basos (auto)  0.1 10^3/uL (0.0-0.2)   01/11/20  06:52    


 


Seg Neutrophils %  64.9 % (42-78)   01/11/20  06:52    


 


Urine Color  YELLOW   01/11/20  06:47    


 


Urine Appearance  CLOUDY   01/11/20  06:47    


 


Urine pH  7.0  (5.0-9.0)   01/11/20  06:47    


 


Ur Specific Gravity  1.009   01/11/20  06:47    


 


Urine Protein  30 mg/dL (NEGATIVE)  H  01/11/20  06:47    


 


Urine Glucose (UA)  NEGATIVE mg/dL (NEGATIVE)   01/11/20  06:47    


 


Urine Ketones  NEGATIVE mg/dL (NEGATIVE)   01/11/20  06:47    


 


Urine Blood  SMALL  (NEGATIVE)  H  01/11/20  06:47    


 


Urine Nitrite  NEGATIVE  (NEGATIVE)   01/11/20  06:47    


 


Urine Bilirubin  NEGATIVE  (NEGATIVE)   01/11/20  06:47    


 


Urine Urobilinogen  NEGATIVE mg/dL (<2.0)   01/11/20  06:47    


 


Ur Leukocyte Esterase  LARGE  (NEGATIVE)  H  01/11/20  06:47    


 


Urine Ascorbic Acid  NEGATIVE  (NEGATIVE)   01/11/20  06:47    


 


Urine Opiates Screen  NEGATIVE   01/11/20  06:47    


 


Urine Methadone Screen  NEGATIVE   01/11/20  06:47    


 


Ur Barbiturates Screen  NEGATIVE   01/11/20  06:47    


 


Ur Phencyclidine Scrn  NEGATIVE   01/11/20  06:47    


 


Ur Amphetamines Screen  NEGATIVE   01/11/20  06:47    


 


U Benzodiazepines Scrn  NEGATIVE   01/11/20  06:47    


 


Urine Cocaine Screen  NEGATIVE   01/11/20  06:47    


 


U Marijuana (THC) Screen  NEGATIVE   01/11/20  06:47    


 


Blood Type  O POSITIVE   01/11/20  06:52    


 


Antibody Screen  NEGATIVE   01/11/20  06:52    














Plan


Plan of Treatment: 


follow up at [facility] in 4 weeks for post partum check